# Patient Record
Sex: FEMALE | URBAN - METROPOLITAN AREA
[De-identification: names, ages, dates, MRNs, and addresses within clinical notes are randomized per-mention and may not be internally consistent; named-entity substitution may affect disease eponyms.]

---

## 2019-10-16 ENCOUNTER — OFFICE VISIT (OUTPATIENT)
Dept: URBAN - METROPOLITAN AREA CLINIC 88 | Facility: CLINIC | Age: 34
End: 2019-10-16
Payer: COMMERCIAL

## 2019-10-16 DIAGNOSIS — H10.89 OTHER CONJUNCTIVITIS: Primary | ICD-10-CM

## 2019-10-16 PROCEDURE — 92002 INTRM OPH EXAM NEW PATIENT: CPT | Performed by: OPHTHALMOLOGY

## 2019-10-16 ASSESSMENT — INTRAOCULAR PRESSURE
OS: 14
OD: 14

## 2019-10-16 ASSESSMENT — VISUAL ACUITY
OD: 20/20
OS: 20/25

## 2019-10-16 NOTE — IMPRESSION/PLAN
Impression: Other conjunctivitis: H10.89. Plan: Discussed diagnosis in detail with patient. Continue with Tobramycin TID OS x 3 days. Ok to use OTC ART tears 1 qtt TID OU. Patient instructed to call if condition gets worse.

## 2021-01-22 ENCOUNTER — OFFICE VISIT (OUTPATIENT)
Dept: URBAN - METROPOLITAN AREA CLINIC 88 | Facility: CLINIC | Age: 36
End: 2021-01-22
Payer: COMMERCIAL

## 2021-01-22 DIAGNOSIS — H40.1334 PIGMENTARY GLAUCOMA, BILATERAL, INDETERMINATE STAGE: Chronic | ICD-10-CM

## 2021-01-22 DIAGNOSIS — S05.01XA CORNEAL ABRASION W/O FB OF RIGHT EYE, INITIAL ENCOUNTER: Primary | Chronic | ICD-10-CM

## 2021-01-22 DIAGNOSIS — H18.052 KRUKENBERG'S SPINDLE OF LEFT EYE: Chronic | ICD-10-CM

## 2021-01-22 PROCEDURE — 76514 ECHO EXAM OF EYE THICKNESS: CPT | Performed by: OPHTHALMOLOGY

## 2021-01-22 PROCEDURE — 99204 OFFICE O/P NEW MOD 45 MIN: CPT | Performed by: OPHTHALMOLOGY

## 2021-01-22 ASSESSMENT — INTRAOCULAR PRESSURE: OS: 13

## 2021-01-22 ASSESSMENT — KERATOMETRY
OS: 40.25
OD: 40.25

## 2021-01-22 NOTE — IMPRESSION/PLAN
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 Plan: Discussed status in detail with patient. 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 IOP in OS stable. OD not checked today due to corneal abrasion. Pachy performed today; , . Recommend further testing to decide on possible treatment options. RTC for IOP check OU, Visual field 24-2, and OCT ON.

## 2021-01-22 NOTE — IMPRESSION/PLAN
Impression: Corneal abrasion w/o FB of right eye, initial encounter: S05. [id number]. Plan: Resolved. Patient to discontinue the erythromycin. Recommend artificial tears for comfort. Patient can resume contact lens wear.

## 2021-01-25 ENCOUNTER — OFFICE VISIT (OUTPATIENT)
Dept: URBAN - METROPOLITAN AREA CLINIC 88 | Facility: CLINIC | Age: 36
End: 2021-01-25
Payer: COMMERCIAL

## 2021-01-25 DIAGNOSIS — H18.053 BILATERAL KRUKENBERG'S SPINDLE: Chronic | ICD-10-CM

## 2021-01-25 PROCEDURE — 92133 CPTRZD OPH DX IMG PST SGM ON: CPT | Performed by: OPHTHALMOLOGY

## 2021-01-25 PROCEDURE — 99213 OFFICE O/P EST LOW 20 MIN: CPT | Performed by: OPHTHALMOLOGY

## 2021-01-25 PROCEDURE — 92083 EXTENDED VISUAL FIELD XM: CPT | Performed by: OPHTHALMOLOGY

## 2021-01-25 ASSESSMENT — INTRAOCULAR PRESSURE
OD: 15
OS: 15

## 2021-01-25 NOTE — IMPRESSION/PLAN
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 Plan: Discussed status in detail with patient. 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 IOP stable OU. Hx of Pachy; , . Krukenberg's spindle prominent OU. Baseline OCT ON performed today, large cup to disc ratio w/normal fiber layer OU. Visual field today: OD WNL, OS invalid due to 100% fixation loss, will repeat. RTC in 6 months for Dilated exam, VF 24-2, and OCT ON.